# Patient Record
Sex: FEMALE | URBAN - METROPOLITAN AREA
[De-identification: names, ages, dates, MRNs, and addresses within clinical notes are randomized per-mention and may not be internally consistent; named-entity substitution may affect disease eponyms.]

---

## 2021-05-25 ENCOUNTER — RECORDS - HEALTHEAST (OUTPATIENT)
Dept: ADMINISTRATIVE | Facility: CLINIC | Age: 76
End: 2021-05-25

## 2024-06-01 ENCOUNTER — TRANSFERRED RECORDS (OUTPATIENT)
Dept: MULTI SPECIALTY CLINIC | Facility: CLINIC | Age: 79
End: 2024-06-01

## 2024-06-01 LAB — RETINOPATHY: NORMAL

## 2024-12-30 ENCOUNTER — OFFICE VISIT (OUTPATIENT)
Dept: URGENT CARE | Facility: URGENT CARE | Age: 79
End: 2024-12-30
Payer: COMMERCIAL

## 2024-12-30 ENCOUNTER — TELEPHONE (OUTPATIENT)
Dept: FAMILY MEDICINE | Facility: CLINIC | Age: 79
End: 2024-12-30
Payer: COMMERCIAL

## 2024-12-30 VITALS
TEMPERATURE: 97.5 F | SYSTOLIC BLOOD PRESSURE: 165 MMHG | RESPIRATION RATE: 16 BRPM | HEART RATE: 71 BPM | OXYGEN SATURATION: 99 % | DIASTOLIC BLOOD PRESSURE: 76 MMHG | WEIGHT: 139 LBS

## 2024-12-30 DIAGNOSIS — R07.0 THROAT PAIN: Primary | ICD-10-CM

## 2024-12-30 LAB
DEPRECATED S PYO AG THROAT QL EIA: NEGATIVE
S PYO DNA THROAT QL NAA+PROBE: NOT DETECTED

## 2024-12-30 PROCEDURE — 99203 OFFICE O/P NEW LOW 30 MIN: CPT | Performed by: NURSE PRACTITIONER

## 2024-12-30 PROCEDURE — 87651 STREP A DNA AMP PROBE: CPT | Performed by: NURSE PRACTITIONER

## 2024-12-30 RX ORDER — LORAZEPAM 0.5 MG/1
TABLET ORAL
COMMUNITY
Start: 2024-09-18

## 2024-12-30 RX ORDER — TRAZODONE HYDROCHLORIDE 50 MG/1
50 TABLET, FILM COATED ORAL
COMMUNITY
Start: 2023-09-05

## 2024-12-30 RX ORDER — GABAPENTIN 300 MG/1
300 CAPSULE ORAL
COMMUNITY
Start: 2023-11-01

## 2024-12-30 RX ORDER — AMLODIPINE BESYLATE 5 MG/1
1 TABLET ORAL DAILY
COMMUNITY
Start: 2024-10-09

## 2024-12-30 RX ORDER — LOSARTAN POTASSIUM 100 MG/1
1 TABLET ORAL
COMMUNITY
Start: 2024-10-18

## 2024-12-30 RX ORDER — FLUTICASONE PROPIONATE 50 MCG
2 SPRAY, SUSPENSION (ML) NASAL
COMMUNITY
Start: 2023-10-18

## 2024-12-30 RX ORDER — CYANOCOBALAMIN 1000 UG/ML
INJECTION, SOLUTION INTRAMUSCULAR; SUBCUTANEOUS
COMMUNITY
Start: 2024-05-14

## 2024-12-30 NOTE — TELEPHONE ENCOUNTER
Patient calling requesting an appointment for possible strep throat.    Advised patient no appointments today and to seek care at Urgent Care. Patient voiced understanding.    Rajan FLEMING RN  [facility]

## 2024-12-30 NOTE — PROGRESS NOTES
SUBJECTIVE:   Janice Anglin is a 79 year old female presenting with a chief complaint of   Chief Complaint   Patient presents with    Throat Pain     X 3 days   No headache or sinus pressure; does have post nasal drip.  Dayquil and nyquil tried which helped.   Around grandson who also has sore throat.    Past Medical History:   Diagnosis Date    Benign neoplasm of colon     Esophageal reflux     Gastroesophageal reflux    Pure hypercholesterolemia     Type II or unspecified type diabetes mellitus without mention of complication, not stated as uncontrolled     Diabetes mellitus    Unspecified asthma(493.90)     Asthma    Unspecified essential hypertension     Essential hypertension     Family History   Problem Relation Age of Onset    Diabetes Mother     Diabetes Other         self, pre diabetes    Hypertension No family hx of     Cerebrovascular Disease Mother     Cerebrovascular Disease Paternal Grandmother     Breast Cancer No family hx of     Cancer - colorectal Paternal Grandfather     Cancer - colorectal Father         ?    Alzheimer Disease No family hx of     Cancer Father         oral    Cancer Maternal Grandmother         bone    Heart Disease Mother         tia's    Thyroid Disease No family hx of     Eye Disorder Mother         BLIND DUE TO DIABETES     Current Outpatient Medications   Medication Sig Dispense Refill    amLODIPine (NORVASC) 5 MG tablet Take 1 tablet by mouth daily.      cyanocobalamin (CYANOCOBALAMIN) 1000 mcg/mL injection INJECT 1 ML (CC) INTRAMUSCULARLY ONCE EVERY MONTH      Elemental iron 65 mg Vitamin C 125 mg (VITRON C)  MG TABS tablet Take 65 mg of iron by mouth.      fluticasone (FLONASE) 50 MCG/ACT nasal spray Spray 2 sprays in nostril.      gabapentin (NEURONTIN) 300 MG capsule Take 300 mg by mouth.      LIPITOR 20 MG OR TABS 1 TABLET DAILY      LORazepam (ATIVAN) 0.5 MG tablet TAKE 1 TABLET BY MOUTH ONCE DAILY AT BEDTIME AS NEEDED FOR ANXIETY      losartan (COZAAR)  100 MG tablet Take 1 tablet by mouth daily at 2 pm.      traZODone (DESYREL) 50 MG tablet Take 50 mg by mouth.      COZAAR 25 MG OR TABS 1 TABLET DAILY (Patient not taking: Reported on 12/30/2024)      NEW MED ANTACID TABLET- 1 TABLET 2 TIMES DAILY      PROZAC 20 MG OR CAPS 1 CAPSULE EVERY MORNING      TYLENOL ARTHRITIS PAIN 650 MG OR TBCR 2 TABLETS EVERY 12 HOURS AS NEEDED       Social History     Tobacco Use    Smoking status: Former    Smokeless tobacco: Not on file    Tobacco comments:     quit 35 years ago   Substance Use Topics    Alcohol use: Yes     Comment: rare       OBJECTIVE  BP (!) 165/76   Pulse 71   Temp 97.5  F (36.4  C) (Tympanic)   Resp 16   Wt 63 kg (139 lb)   SpO2 99%     Physical Exam  Constitutional:       Appearance: Normal appearance.   HENT:      Head: Normocephalic.      Nose: Nose normal.      Mouth/Throat:      Mouth: Mucous membranes are moist.      Pharynx: Oropharyngeal exudate and posterior oropharyngeal erythema present.   Eyes:      Conjunctiva/sclera: Conjunctivae normal.   Cardiovascular:      Rate and Rhythm: Normal rate and regular rhythm.      Heart sounds: Normal heart sounds.   Pulmonary:      Effort: Pulmonary effort is normal.      Breath sounds: Normal breath sounds.   Musculoskeletal:      Cervical back: Neck supple.   Skin:     General: Skin is warm and dry.   Neurological:      Mental Status: She is alert.   Psychiatric:         Mood and Affect: Mood normal.         Labs:  Results for orders placed or performed in visit on 12/30/24 (from the past 24 hours)   Streptococcus A Rapid Screen w/Reflex to PCR - Clinic Collect    Specimen: Throat; Swab   Result Value Ref Range    Group A Strep antigen Negative Negative     Strep PCR: pending      ASSESSMENT:  1. Throat pain (Primary)    - Streptococcus A Rapid Screen w/Reflex to PCR - Clinic Collect  - Group A Streptococcus PCR Throat Swab      PLAN:  1) Increase fluids and rest  2) Try Mucinex and Neti pot over the  counter for congestion  3) Continue taking Tylenol/Ibuprofen for fever/pain relief as needed.  4) Salt water gargles and lozenges can be helpful for throat relief  5) You will only be notified of the confirmatory strep results if they are positive.

## 2024-12-30 NOTE — LETTER
December 31, 2024      Janice Savita Anglin  [address] [address]        Dear ,    We are writing to inform you of your test results.    Your further strep test was negative.    Resulted Orders   Streptococcus A Rapid Screen w/Reflex to PCR - Clinic Collect   Result Value Ref Range    Group A Strep antigen Negative Negative   Group A Streptococcus PCR Throat Swab   Result Value Ref Range    Group A strep by PCR Not Detected Not Detected    Narrative    The Xpert Xpress Strep A test, performed on the [employer]  Instrument Systems, is a rapid, qualitative in vitro diagnostic test for the detection of Streptococcus pyogenes (Group A ß-hemolytic Streptococcus, Strep A) in throat swab specimens from patients with signs and symptoms of pharyngitis. The Xpert Xpress Strep A test can be used as an aid in the diagnosis of Group A Streptococcal pharyngitis. The assay is not intended to monitor treatment for Group A Streptococcus infections. The Xpert Xpress Strep A test utilizes an automated real-time polymerase chain reaction (PCR) to detect Streptococcus pyogenes DNA.       If you have any questions or concerns, please call the clinic at the number listed above.       Sincerely,      Rula Lopez NP    Electronically signed

## 2025-01-15 PROBLEM — M25.551 PAIN IN RIGHT HIP: Status: ACTIVE | Noted: 2018-06-28

## 2025-01-15 PROBLEM — H25.019 CORTICAL SENILE CATARACT: Status: ACTIVE | Noted: 2023-07-31

## 2025-01-15 PROBLEM — H49.01: Status: ACTIVE | Noted: 2023-11-06

## 2025-01-15 PROBLEM — R53.83 FATIGUE: Status: ACTIVE | Noted: 2023-01-24

## 2025-01-15 PROBLEM — M25.50 PAIN IN JOINT: Status: ACTIVE | Noted: 2018-11-21

## 2025-01-15 PROBLEM — M81.0 OSTEOPOROSIS: Status: ACTIVE | Noted: 2018-03-29

## 2025-01-15 PROBLEM — I10 PRIMARY HYPERTENSION: Status: ACTIVE | Noted: 2018-06-19

## 2025-01-15 PROBLEM — R00.1 BRADYCARDIA: Status: ACTIVE | Noted: 2018-11-21

## 2025-01-15 PROBLEM — H25.13 AGE-RELATED NUCLEAR CATARACT, BILATERAL: Status: ACTIVE | Noted: 2023-07-31

## 2025-01-16 PROBLEM — Z78.0 POST-MENOPAUSE: Status: ACTIVE | Noted: 2025-01-16

## 2025-01-16 PROBLEM — R21 RASH: Status: ACTIVE | Noted: 2025-01-16

## 2025-01-16 PROBLEM — F41.9 ANXIETY: Status: ACTIVE | Noted: 2025-01-16

## 2025-01-17 PROBLEM — M25.50 PAIN IN JOINT: Status: RESOLVED | Noted: 2018-11-21 | Resolved: 2025-01-17

## 2025-01-17 PROBLEM — R53.83 FATIGUE: Status: RESOLVED | Noted: 2023-01-24 | Resolved: 2025-01-17

## 2025-02-11 ENCOUNTER — TELEPHONE (OUTPATIENT)
Dept: OPHTHALMOLOGY | Facility: CLINIC | Age: 80
End: 2025-02-11

## 2025-02-11 ENCOUNTER — OFFICE VISIT (OUTPATIENT)
Dept: OPHTHALMOLOGY | Facility: CLINIC | Age: 80
End: 2025-02-11
Payer: COMMERCIAL

## 2025-02-11 DIAGNOSIS — H02.88A MEIBOMIAN GLAND DYSFUNCTION (MGD) OF UPPER AND LOWER LIDS OF BOTH EYES: Primary | ICD-10-CM

## 2025-02-11 DIAGNOSIS — H25.813 COMBINED FORMS OF AGE-RELATED CATARACT OF BOTH EYES: ICD-10-CM

## 2025-02-11 DIAGNOSIS — H49.01 THIRD (OCULOMOTOR) NERVE PALSY, RIGHT EYE: ICD-10-CM

## 2025-02-11 DIAGNOSIS — H52.03 HYPEROPIA OF BOTH EYES WITH ASTIGMATISM AND PRESBYOPIA: ICD-10-CM

## 2025-02-11 DIAGNOSIS — H52.4 HYPEROPIA OF BOTH EYES WITH ASTIGMATISM AND PRESBYOPIA: ICD-10-CM

## 2025-02-11 DIAGNOSIS — H52.203 HYPEROPIA OF BOTH EYES WITH ASTIGMATISM AND PRESBYOPIA: ICD-10-CM

## 2025-02-11 DIAGNOSIS — H02.88B MEIBOMIAN GLAND DYSFUNCTION (MGD) OF UPPER AND LOWER LIDS OF BOTH EYES: Primary | ICD-10-CM

## 2025-02-11 PROCEDURE — 92015 DETERMINE REFRACTIVE STATE: CPT | Performed by: OPHTHALMOLOGY

## 2025-02-11 PROCEDURE — 92004 COMPRE OPH EXAM NEW PT 1/>: CPT | Performed by: OPHTHALMOLOGY

## 2025-02-11 ASSESSMENT — VISUAL ACUITY
OD_CC+: +3
OS_BAT_MED: 20/30+2
CORRECTION_TYPE: GLASSES
OD_BAT_HIGH: 20/50
OS_CC: 20/30
OS_CC+: +2
OD_BAT_MED: 20/40
METHOD_MR: DIAGNOSTIC MR
METHOD: SNELLEN - LINEAR
OD_PH_CC: 20/30
OD_BAT_LOW: 20/40
OD_CC: 20/50
OS_BAT_LOW: 20/25
OS_BAT_HIGH: 20/30-1

## 2025-02-11 ASSESSMENT — REFRACTION_WEARINGRX
OS_AXIS: 007
OS_ADD: +2.50
OS_SPHERE: +1.25
OD_AXIS: 002
OS_CYLINDER: +0.75
OD_SPHERE: +1.25
OD_ADD: +2.50
OD_CYLINDER: +0.50

## 2025-02-11 ASSESSMENT — CONF VISUAL FIELD
OD_SUPERIOR_NASAL_RESTRICTION: 0
OS_SUPERIOR_TEMPORAL_RESTRICTION: 0
OD_SUPERIOR_TEMPORAL_RESTRICTION: 0
OS_NORMAL: 1
OS_INFERIOR_TEMPORAL_RESTRICTION: 0
OD_INFERIOR_TEMPORAL_RESTRICTION: 0
OD_INFERIOR_NASAL_RESTRICTION: 0
METHOD: COUNTING FINGERS
OD_NORMAL: 1
OS_INFERIOR_NASAL_RESTRICTION: 0
OS_SUPERIOR_NASAL_RESTRICTION: 0

## 2025-02-11 ASSESSMENT — TONOMETRY
OS_IOP_MMHG: 18
OD_IOP_MMHG: 18
IOP_METHOD: TONOPEN

## 2025-02-11 ASSESSMENT — REFRACTION_MANIFEST
OS_CYLINDER: +0.75
OD_SPHERE: +0.75
OD_AXIS: 180
OS_SPHERE: +1.00
OS_AXIS: 013
OD_CYLINDER: +0.75

## 2025-02-11 ASSESSMENT — CUP TO DISC RATIO
OS_RATIO: 0.3
OD_RATIO: 0.3

## 2025-02-11 ASSESSMENT — SLIT LAMP EXAM - LIDS
COMMENTS: 2+ MEIBOMIAN GLAND DYSFUNCTION
COMMENTS: 2+ MEIBOMIAN GLAND DYSFUNCTION

## 2025-02-11 NOTE — PROGRESS NOTES
HPI       Cataract Evaluation    In both eyes.             Comments    Patient is here today for a cataract evaluation, referred by Dr. Jasiel Colorado. Patient feels her vision is not very good but with glasses her near vision is good. She doesn't like driving at night due to bright lights and headlights that are bothersome to her. Too much glare. Patient does notice a floater once in a while, not sure which eye. She has no flashing lights.   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     Ocular Medications: Systane both eyes as needed     Patient has had a Blepharoplasty, both eyes in 2021.   H/o Hordeolum of the LLL in 2023, Hyperopia, and cataracts both eyes           Last edited by Laureen Wray on 2/11/2025 12:44 PM.         Review of systems for the eyes was negative other than the pertinent positives/negatives listed in the HPI.      Assessment & Plan    HPI:  Janice Anglin is a 79 year old female with history of HTN, HLD, MDD presents for exam. She was previously told she had cataract but is not having any limitations in vision.      Has adopted her great grandson (born 2014) and lives alone with him    POHx: CN3 palsy, hyperopia with astigmatism and presbyopia   PMHx:HTN, HLD, MDD  Current Medications:   Current Outpatient Medications   Medication Sig Dispense Refill    amLODIPine (NORVASC) 5 MG tablet Take 1 tablet (5 mg) by mouth daily. 30 tablet 5    atorvastatin (LIPITOR) 20 MG tablet Take 1 tablet (20 mg) by mouth daily. 30 tablet 3    cyanocobalamin (CYANOCOBALAMIN) 1000 mcg/mL injection INJECT 1 ML (CC) INTRAMUSCULARLY ONCE EVERY MONTH      cyanocobalamin (VITAMIN B-12) 100 MCG tablet Take 1 tablet (100 mcg) by mouth daily. 30 tablet 3    Elemental iron 65 mg Vitamin C 125 mg (VITRON C)  MG TABS tablet Take 65 mg of iron by mouth.      FLUoxetine (PROZAC) 20 MG capsule Take 1 capsule (20 mg) by mouth every  "morning. 30 capsule 5    fluticasone (FLONASE) 50 MCG/ACT nasal spray Spray 2 sprays in nostril.      gabapentin (NEURONTIN) 300 MG capsule Take 300 mg by mouth.      ketoconazole (NIZORAL) 2 % external cream Apply topically daily. 60 g 1    LORazepam (ATIVAN) 0.5 MG tablet Take 1 tablet (0.5 mg) by mouth nightly as needed for anxiety. 30 tablet 3    losartan (COZAAR) 100 MG tablet Take 1 tablet by mouth daily at 2 pm.      losartan (COZAAR) 25 MG tablet Take 1 tablet (25 mg) by mouth daily. 30 tablet 5    NEW MED ANTACID TABLET- 1 TABLET 2 TIMES DAILY      traZODone (DESYREL) 50 MG tablet Take 1 tablet (50 mg) by mouth at bedtime. 30 tablet 1    TYLENOL ARTHRITIS PAIN 650 MG OR TBCR 2 TABLETS EVERY 12 HOURS AS NEEDED       No current facility-administered medications for this visit.     FHx:   PSHx: denies history of ocular surgeries       Current Eye Medications:      Assessment & Plan:  (H02.88A,  H02.88B) Meibomian gland dysfunction (MGD) of upper and lower lids of both eyes  (primary encounter diagnosis)  Start artificial tears four times daily (best used before reading, using a computer or watching TV)  Start warm compresses for five minute twice daily (warm wash cloth or a microwaved, uncooked bag of rice in a clean sock)     (H25.813) Combined forms of age-related cataract of both eyes  Mild cataracts are present and may account for some of the patient's visual complaints. No treatment currently recommended. The patient will monitor for vision changes and contact us with any decrease in vision. Recheck next visit   Vision does not presently inferior with ADLs  Observe    (H49.01) Third (oculomotor) nerve palsy, right eye  resolved   Per 10/31/23 note from Dr. Colorado  \"scheduled for cataract surgery but on 10/21/2023 developed a bad headache and presented to the emergency department. At that time there was no double vision or ptosis. The patient had a CT and CTA which was negative. The patient returned to " "the ED on 10/26/2023 at that time complained of droopiness of the right upper eyelid in the emergency room doctor noticed that the right eye movements were abnormal. The emergency room doctor spoke with Ophthalmology in [place] who thought this was microvascular \"    (H52.03,  H52.203,  H52.4) Hyperopia of both eyes with astigmatism and presbyopia  Patient has minimal change in hyperopia but a copy of today's glasses prescription was given.  The patient may wish to update the glasses if the lenses are scratched or the frames are too small.  Presbyopia is difficulty seeing up close and is treated with bifocals or over the counter reading glasses    Return in about 1 year (around 2/11/2026) for Annual Visit-v/t/d/MRx.        Sylvain Bunn MD     Attending Physician Attestation:  Complete documentation of historical and exam elements from today's encounter can be found in the full encounter summary report (not reduplicated in this progress note).  I personally obtained the chief complaint(s) and history of present illness.  I confirmed and edited as necessary the review of systems, past medical/surgical history, family history, social history, and examination findings as documented by others; and I examined the patient myself.  I personally reviewed the relevant tests, images, and reports as documented above.  I formulated and edited as necessary the assessment and plan and discussed the findings and management plan with the patient and family. - Sylvain Bunn MD         "

## 2025-02-11 NOTE — NURSING NOTE
Chief Complaints and History of Present Illnesses   Patient presents with    Cataract Evaluation       Chief Complaint(s) and History of Present Illness(es)       Cataract Evaluation              Laterality: both eyes              Comments    Patient is here today for a cataract evaluation, referred by Dr. Jasiel Colorado. Patient feels her vision is not very good but with glasses her near vision is good. She doesn't like driving at night due to bright lights and headlights that are bothersome to her. Too much glare. Patient does notice a floater once in a while, not sure which eye. She has no flashing lights.   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     Ocular Medications: Systane both eyes as needed     Patient has had a Blepharoplasty, both eyes in 2021.   H/o Hordeolum of the LLL in 2023, Hyperopia, and cataracts both eyes                    Laureen Wray, COA

## 2025-02-11 NOTE — TELEPHONE ENCOUNTER
Left Voicemail (1st Attempt) for the patient to call back and schedule the following:    Appointment type: return  Provider: dr. perales  Return date: 2/11/2026  Specialty phone number: [phone]   Additonal Notes:  Return in about 1 year (around 2/11/2026) for Annual Visit-v/t/willian/Nicolle lawson Complex   Orthopedics, Podiatry, Sports Medicine, Ent ,Eye , Audiology, Adult Endocrine & Diabetes, Nutrition & Medication Therapy Management Specialties   [facility]

## 2025-02-27 ENCOUNTER — OFFICE VISIT (OUTPATIENT)
Dept: DERMATOLOGY | Facility: CLINIC | Age: 80
End: 2025-02-27
Payer: COMMERCIAL

## 2025-02-27 DIAGNOSIS — D48.5 NEOPLASM OF UNCERTAIN BEHAVIOR OF SKIN: ICD-10-CM

## 2025-02-27 DIAGNOSIS — D18.01 CHERRY ANGIOMA: ICD-10-CM

## 2025-02-27 DIAGNOSIS — L82.1 SEBORRHEIC KERATOSIS: ICD-10-CM

## 2025-02-27 DIAGNOSIS — L21.9 DERMATITIS, SEBORRHEIC: Primary | ICD-10-CM

## 2025-02-27 DIAGNOSIS — D22.9 MULTIPLE BENIGN NEVI: ICD-10-CM

## 2025-02-27 DIAGNOSIS — L66.12 FRONTAL FIBROSING ALOPECIA: ICD-10-CM

## 2025-02-27 DIAGNOSIS — L81.4 LENTIGO: ICD-10-CM

## 2025-02-27 RX ORDER — KETOCONAZOLE 20 MG/ML
SHAMPOO, SUSPENSION TOPICAL
Qty: 120 ML | Refills: 5 | Status: SHIPPED | OUTPATIENT
Start: 2025-02-27

## 2025-02-27 RX ORDER — CLOBETASOL PROPIONATE 0.5 MG/ML
SOLUTION TOPICAL
Qty: 50 ML | Refills: 5 | Status: SHIPPED | OUTPATIENT
Start: 2025-02-27

## 2025-02-27 ASSESSMENT — PAIN SCALES - GENERAL: PAINLEVEL_OUTOF10: NO PAIN (0)

## 2025-02-27 NOTE — PROGRESS NOTES
Janice Anglin is a pleasant 79 year old year old female patient here today for skin check.  She denies any new or changing nevi. No painful or bleeding skin lesions she notes scalp is itchy and also receding hair line. Patient has no other skin complaints today.  Remainder of the HPI, Meds, PMH, Allergies, FH, and SH was reviewed in chart.    Pertinent Hx:  no personal history of skin cancer.   Past Medical History:   Diagnosis Date    Benign neoplasm of colon     Diabetes mellitus, type 2 (H)     Improved after gastric bypass surgery      Esophageal reflux     Gastroesophageal reflux    Fatigue 01/24/2023    History of gastrointestinal disease 12/14/2012    Menopausal hot flushes 06/28/2011    IMO Update 10/11      Pain in joint 11/21/2018    Pure hypercholesterolemia     Type II or unspecified type diabetes mellitus without mention of complication, not stated as uncontrolled     Diabetes mellitus    Unspecified asthma(493.90)     Asthma    Unspecified essential hypertension     Essential hypertension       Past Surgical History:   Procedure Laterality Date    C OB/GYN PROCEDURE                          DATE:  1973    fibrois    RW OB/GYN (ABSTRACTED)  2000    gallbladder     ZZHC COLONOSCOPY W SNARE REMOVAL TUMOR/POLYP/LESION          Family History   Problem Relation Age of Onset    Diabetes Mother     Diabetes Other         self, pre diabetes    Hypertension No family hx of     Cerebrovascular Disease Mother     Cerebrovascular Disease Paternal Grandmother     Breast Cancer No family hx of     Cancer - colorectal Paternal Grandfather     Cancer - colorectal Father         ?    Alzheimer Disease No family hx of     Cancer Father         oral    Cancer Maternal Grandmother         bone    Heart Disease Mother         tia's    Thyroid Disease No family hx of     Eye Disorder Mother         BLIND DUE TO DIABETES       Social History     Socioeconomic History    Marital status:      Spouse name: Not on  file    Number of children: Not on file    Years of education: Not on file    Highest education level: Not on file   Occupational History     Comment: housewife   Tobacco Use    Smoking status: Former     Passive exposure: Never    Smokeless tobacco: Never    Tobacco comments:     quit 35 years ago   Vaping Use    Vaping status: Never Used   Substance and Sexual Activity    Alcohol use: Yes     Comment: rare    Drug use: Not on file    Sexual activity: Yes     Partners: Male   Other Topics Concern     Service Not Asked    Blood Transfusions No    Caffeine Concern No    Occupational Exposure Not Asked    Hobby Hazards Not Asked    Sleep Concern Yes    Stress Concern Yes    Weight Concern Yes    Special Diet Yes    Back Care Yes    Exercise Yes    Bike Helmet Not Asked    Seat Belt Yes    Self-Exams Yes   Social History Narrative    Not on file     Social Drivers of Health     Financial Resource Strain: Low Risk  (1/16/2025)    Financial Resource Strain     Within the past 12 months, have you or your family members you live with been unable to get utilities (heat, electricity) when it was really needed?: No   Food Insecurity: Low Risk  (1/16/2025)    Food Insecurity     Within the past 12 months, did you worry that your food would run out before you got money to buy more?: No     Within the past 12 months, did the food you bought just not last and you didn t have money to get more?: No   Transportation Needs: Low Risk  (1/16/2025)    Transportation Needs     Within the past 12 months, has lack of transportation kept you from medical appointments, getting your medicines, non-medical meetings or appointments, work, or from getting things that you need?: No   Physical Activity: Unknown (1/16/2025)    Exercise Vital Sign     Days of Exercise per Week: Patient declined     Minutes of Exercise per Session: Not on file   Stress: Stress Concern Present (1/16/2025)    [employer] -  Occupational Stress Questionnaire     Feeling of Stress : To some extent   Social Connections: Unknown (1/16/2025)    Social Connection and Isolation Panel [NHANES]     Frequency of Communication with Friends and Family: Not on file     Frequency of Social Gatherings with Friends and Family: Twice a week     Attends Yarsani Services: Not on file     Active Member of Clubs or Organizations: Not on file     Attends Club or Organization Meetings: Not on file     Marital Status: Not on file   Interpersonal Safety: Low Risk  (1/16/2025)    Interpersonal Safety     Do you feel physically and emotionally safe where you currently live?: Yes     Within the past 12 months, have you been hit, slapped, kicked or otherwise physically hurt by someone?: No     Within the past 12 months, have you been humiliated or emotionally abused in other ways by your partner or ex-partner?: No   Housing Stability: Low Risk  (1/16/2025)    Housing Stability     Do you have housing? : Yes     Are you worried about losing your housing?: No       Outpatient Encounter Medications as of 2/27/2025   Medication Sig Dispense Refill    clobetasol (TEMOVATE) 0.05 % external solution Apply three times weekly to scalp. 50 mL 5    ketoconazole (NIZORAL) 2 % external shampoo Leave on scalp for 5 minutes then rinse. Use 1-2 times weekly. 120 mL 5    amLODIPine (NORVASC) 5 MG tablet Take 1 tablet (5 mg) by mouth daily. 30 tablet 5    atorvastatin (LIPITOR) 20 MG tablet Take 1 tablet (20 mg) by mouth daily. 30 tablet 3    cyanocobalamin (CYANOCOBALAMIN) 1000 mcg/mL injection INJECT 1 ML (CC) INTRAMUSCULARLY ONCE EVERY MONTH      cyanocobalamin (VITAMIN B-12) 100 MCG tablet Take 1 tablet (100 mcg) by mouth daily. 30 tablet 3    Elemental iron 65 mg Vitamin C 125 mg (VITRON C)  MG TABS tablet Take 65 mg of iron by mouth.      FLUoxetine (PROZAC) 20 MG capsule Take 1 capsule (20 mg) by mouth every morning. 30 capsule 5    fluticasone (FLONASE) 50 MCG/ACT  nasal spray Spray 2 sprays in nostril.      gabapentin (NEURONTIN) 300 MG capsule Take 300 mg by mouth.      ketoconazole (NIZORAL) 2 % external cream Apply topically daily. 60 g 1    LORazepam (ATIVAN) 0.5 MG tablet Take 1 tablet (0.5 mg) by mouth nightly as needed for anxiety. 30 tablet 3    losartan (COZAAR) 100 MG tablet Take 1 tablet by mouth daily at 2 pm.      losartan (COZAAR) 25 MG tablet Take 1 tablet (25 mg) by mouth daily. 30 tablet 5    NEW MED ANTACID TABLET- 1 TABLET 2 TIMES DAILY      traZODone (DESYREL) 50 MG tablet Take 1 tablet (50 mg) by mouth at bedtime. 30 tablet 1    TYLENOL ARTHRITIS PAIN 650 MG OR TBCR 2 TABLETS EVERY 12 HOURS AS NEEDED       No facility-administered encounter medications on file as of 2/27/2025.             O:   NAD, WDWN, Alert & Oriented, Mood & Affect wnl, Vitals stable   Here today with her daughter    LMP 01/01/1972    General appearance normal   Vitals stable   Alert, oriented and in no acute distress      Hair receding on frontal and temporal scalp, scarring hair loss   Mild scaling on scalp  Stuck on papules and brown macules on trunk and ext   Red papules on trunk  1.1 cm brown irregular macule left low back  0.6 cm brown irregular macule on left upper arm   Brown papules and macules with regular pigment network on torso and extremities     The remainder of skin exam is normal       Eyes: Conjunctivae/lids:Normal     ENT: Lips: normal    MSK:Normal    Cardiovascular: peripheral edema none    Pulm: Breathing Normal    Neuro/Psych: Orientation:Alert and Orientedx3 ; Mood/Affect:normal   A/P:  R/O atypical nevus on left low back and left upper arm  TANGENTIAL BIOPSY SENT OUT:  After consent, anesthesia with LEC and prep, tangential excision performed and specimen sent out for permanent section histology.  No complications and routine wound care. Patient told to call our office in 1-2 weeks for result.      2. FFA  Discussed scarring hair loss, treatment is preserve  current hair  Apply clobetasol solution three times weekly to hair line.   3. Seborrheic dermatitis   Use ketoconazole shampoo leave on scalp for 5 minutes then rinse. Use 1-2 times weekly.   Apply clobetasol solution twice daily.   4.  Seborrheic keratosis, lentigo, angioma, benign nevi   It was a pleasure speaking to Janice Anglin today.  BENIGN LESIONS DISCUSSED WITH PATIENT:  I discussed the specifics of tumor, prognosis, and genetics of benign lesions.  I explained that treatment of these lesions would be purely cosmetic and not medically neccessary.  I discussed with patient different removal options including excision, cautery and /or laser.      Nature and genetics of benign skin lesions dicussed with patient.  Signs and Symptoms of skin cancer discussed with patient.  ABCDEs of melanoma reviewed with patient.  Patient encouraged to perform monthly skin exams.  UV precautions reviewed with patient.  Risks of non-melanoma skin cancer discussed with patient   Return to clinic pending biopsy results

## 2025-02-27 NOTE — PATIENT INSTRUCTIONS
Wound Care Instructions   FOR SUPERFICIAL WOUNDS   [facility] [place] [phone]    Left Upper Arm & Left Low Back     AFTER 24 HOURS YOU SHOULD REMOVE THE BANDAGE AND BEGIN DAILY DRESSING CHANGES AS FOLLOWS:     1) Remove Dressing.     2) Clean and dry the area with tap water using a Q-tip or sterile gauze pad.     3) Apply Vaseline, Aquaphor, Polysporin ointment or Bacitracin ointment over entire wound.  Do NOT use Neosporin ointment.     4) Cover the wound with a band-aid, or a sterile non-stick gauze pad and micropore paper tape      REPEAT THESE INSTRUCTIONS AT LEAST ONCE A DAY UNTIL THE WOUND HAS COMPLETELY HEALED.    It is an old wives tale that a wound heals better when it is exposed to air and allowed to dry out. The wound will heal faster with a better cosmetic result if it is kept moist with ointment and covered with a bandage.    **Do not let the wound dry out.**      Supplies Needed:      *Cotton tipped applicators (Q-tips)    *Polysporin Ointment or Bacitracin Ointment (NOT NEOSPORIN)    *Band-aids or non-stick gauze pads and micropore paper tape.      PATIENT INFORMATION:    During the healing process you will notice a number of changes. All wounds develop a small halo of redness surrounding the wound.  This means healing is occurring. Severe itching with extensive redness usually indicates sensitivity to the ointment or bandage tape used to dress the wound.  You should call our office if this develops.      Swelling  and/or discoloration around your surgical site is common, particularly when performed around the eye.    All wounds normally drain.  The larger the wound the more drainage there will be.  After 7-10 days, you will notice the wound beginning to shrink and new skin will begin to grow.  The wound is healed when you can see skin has formed over the entire area.  A healed wound has a healthy, shiny look to the surface and is red to dark pink in color to  normalize.  Wounds may take approximately 4-6 weeks to heal.  Larger wounds may take 6-8 weeks.  After the wound is healed you may discontinue dressing changes.    You may experience a sensation of tightness as your wound heals. This is normal and will gradually subside.    Your healed wound may be sensitive to temperature changes. This sensitivity improves with time, but if you re having a lot of discomfort, try to avoid temperature extremes.    Patients frequently experience itching after their wound appears to have healed because of the continue healing under the skin.  Plain Vaseline will help relieve the itching.    POSSIBLE COMPLICATIONS    BLEEDING:    Leave the bandage in place.  Use tightly rolled up gauze or a cloth to apply direct pressure over the bandage for 30  minutes.  Reapply pressure for an additional 30 minutes if necessary  Use additional gauze and tape to maintain pressure once the bleeding has stopped.     Proper skin care from [facility]:    -Eliminate harsh soaps as they strip the natural oils from the skin, often resulting in dry itchy skin ( i.e. Dial, Zest, Marshall[place] Spring)  -Use mild soaps such as Cetaphil or Dove Sensitive Skin in the shower. You do not need to use soap on arms, legs, and trunk every time you shower unless visibly soiled.   -Avoid hot or cold showers.  -After showering, lightly dry off and apply moisturizing within 2-3 minutes. This will help trap moisture in the skin.   -Aggressive use of a moisturizer at least 1-2 times a day to the entire body (including -Vanicream, Cetaphil, Aquaphor or Cerave) and moisturize hands after every washing.  -We recommend using moisturizers that come in a tub that needs to be scooped out, not a pump. This has more of an oil base. It will hold moisture in your skin much better than a water base moisturizer. The above recommended are non-pore clogging.      Wear a sunscreen with at least SPF 30 on your face, ears, neck and V of the  chest daily. Wear sunscreen on other areas of the body if those areas are exposed to the sun throughout the day. Sunscreens can contain physical and/or chemical blockers. Physical blockers are less likely to clog pores, these include zinc oxide and titanium dioxide. Reapply every two hour and after swimming.     Sunscreen examples: https://www.ewg.org/sunscreen/    UV radiation  UVA radiation remains constant throughout the day and throughout the year. It is a longer wavelength than UVB and therefore penetrates deeper into the skin leading to immediate and delayed tanning, photoaging, and skin cancer. 70-80% of UVA and UVB radiation occurs between the hours of 10am-2pm.  UVB radiation  UVB radiation causes the most harmful effects and is more significant during the summer months. However, snow and ice can reflect UVB radiation leading to skin damage during the winter months as well. UVB radiation is responsible for tanning, burning, inflammation, delayed erythema (pinkness), pigmentation (brown spots), and skin cancer.     I recommend self monthly full body exams and yearly full body exams with a dermatology provider. If you develop a new or changing lesion please follow up for examination. Most skin cancers are pink and scaly or pink and pearly. However, we do see blue/brown/black skin cancers.  Consider the ABCDEs of melanoma when giving yourself your monthly full body exam ( don't forget the groin, buttocks, feet, toes, etc). A-asymmetry, B-borders, C-color, D-diameter, E-elevation or evolving. If you see any of these changes please follow up in clinic. If you cannot see your back I recommend purchasing a hand held mirror to use with a larger wall mirror.       Checking for Skin Cancer  You can find cancer early by checking your skin each month. There are 3 kinds of skin cancer. They are melanoma, basal cell carcinoma, and squamous cell carcinoma. Doing monthly skin checks is the best way to find new marks or  skin changes. Follow the instructions below for checking your skin.   The ABCDEs of checking moles for melanoma   Check your moles or growths for signs of melanoma using ABCDE:   Asymmetry: the sides of the mole or growth don t match  Border: the edges are ragged, notched, or blurred  Color: the color within the mole or growth varies  Diameter: the mole or growth is larger than 6 mm (size of a pencil eraser)  Evolving: the size, shape, or color of the mole or growth is changing (evolving is not shown in the images below)    Checking for other types of skin cancer  Basal cell carcinoma or squamous cell carcinoma have symptoms such as:     A spot or mole that looks different from all other marks on your skin  Changes in how an area feels, such as itching, tenderness, or pain  Changes in the skin's surface, such as oozing, bleeding, or scaliness  A sore that does not heal  New swelling or redness beyond the border of a mole    Who s at risk?  Anyone can get skin cancer. But you are at greater risk if you have:   Fair skin, light-colored hair, or light-colored eyes  Many moles or abnormal moles on your skin  A history of sunburns from sunlight or tanning beds  A family history of skin cancer  A history of exposure to radiation or chemicals  A weakened immune system  If you have had skin cancer in the past, you are at risk for recurring skin cancer.   How to check your skin  Do your monthly skin checkups in front of a full-length mirror. Check all parts of your body, including your:   Head (ears, face, neck, and scalp)  Torso (front, back, and sides)  Arms (tops, undersides, upper, and lower armpits)  Hands (palms, backs, and fingers, including under the nails)  Buttocks and genitals  Legs (front, back, and sides)  Feet (tops, soles, toes, including under the nails, and between toes)  If you have a lot of moles, take digital photos of them each month. Make sure to take photos both up close and from a distance. These can  help you see if any moles change over time.   Most skin changes are not cancer. But if you see any changes in your skin, call your doctor right away. Only he or she can diagnose a problem. If you have skin cancer, seeing your doctor can be the first step toward getting the treatment that could save your life.   Howie last reviewed this educational content on 4/1/2019 2000-2020 [employer]. [address] [place] [address], [place] [address]. All rights reserved. This information is not intended as a substitute for professional medical care. Always follow your healthcare professional's instructions.       When should I call my doctor?  If you are worsening or not improving, please, contact us or seek urgent care as noted below.     Who should I call with questions (adults)?    [facility] [phone]  For urgent needs outside of business hours call the [facility] at [phone] and ask for the dermatology resident on call to be paged  If this is a medical emergency and you are unable to reach an ER, Call [phone]      If you need a prescription refill, please contact your pharmacy. Refills are approved or denied by our Physicians during normal business hours, Monday through Friday.  Per office policy, refills will not be granted if you have not been seen within the past year (or sooner depending on the condition).

## 2025-02-27 NOTE — NURSING NOTE
Chief Complaint   Patient presents with    Derm Problem     Constant itchy scalp/ eye brows rash that has been going on for 2 years currently using dandruff shampoo        There were no vitals filed for this visit.  Wt Readings from Last 1 Encounters:   01/16/25 62.6 kg (138 lb)       Nirmala Diaz LPN .................2/27/2025

## 2025-02-27 NOTE — LETTER
2/27/2025      Janice Anglin  [address]  [facility] [address]      Dear Colleague,    Thank you for referring your patient, Janice Anglin, to the [facility]. Please see a copy of my visit note below.    Janice Anglin is a pleasant 79 year old year old female patient here today for skin check.  She denies any new or changing nevi. No painful or bleeding skin lesions she notes scalp is itchy and also receding hair line. Patient has no other skin complaints today.  Remainder of the HPI, Meds, PMH, Allergies, FH, and SH was reviewed in chart.    Pertinent Hx:  no personal history of skin cancer.   Past Medical History:   Diagnosis Date     Benign neoplasm of colon      Diabetes mellitus, type 2 (H)     Improved after gastric bypass surgery       Esophageal reflux     Gastroesophageal reflux     Fatigue 01/24/2023     History of gastrointestinal disease 12/14/2012     Menopausal hot flushes 06/28/2011    IMO Update 10/11       Pain in joint 11/21/2018     Pure hypercholesterolemia      Type II or unspecified type diabetes mellitus without mention of complication, not stated as uncontrolled     Diabetes mellitus     Unspecified asthma(493.90)     Asthma     Unspecified essential hypertension     Essential hypertension       Past Surgical History:   Procedure Laterality Date     C OB/GYN PROCEDURE                          DATE:  1973    fibrois     RW OB/GYN (ABSTRACTED)  2000    gallbladder      ZZHC COLONOSCOPY W SNARE REMOVAL TUMOR/POLYP/LESION          Family History   Problem Relation Age of Onset     Diabetes Mother      Diabetes Other         self, pre diabetes     Hypertension No family hx of      Cerebrovascular Disease Mother      Cerebrovascular Disease Paternal Grandmother      Breast Cancer No family hx of      Cancer - colorectal Paternal Grandfather      Cancer - colorectal Father         ?     Alzheimer Disease No family hx of      Cancer Father          oral     Cancer Maternal Grandmother         bone     Heart Disease Mother         tia's     Thyroid Disease No family hx of      Eye Disorder Mother         BLIND DUE TO DIABETES       Social History     Socioeconomic History     Marital status:      Spouse name: Not on file     Number of children: Not on file     Years of education: Not on file     Highest education level: Not on file   Occupational History     Comment: housewife   Tobacco Use     Smoking status: Former     Passive exposure: Never     Smokeless tobacco: Never     Tobacco comments:     quit 35 years ago   Vaping Use     Vaping status: Never Used   Substance and Sexual Activity     Alcohol use: Yes     Comment: rare     Drug use: Not on file     Sexual activity: Yes     Partners: Male   Other Topics Concern      Service Not Asked     Blood Transfusions No     Caffeine Concern No     Occupational Exposure Not Asked     Hobby Hazards Not Asked     Sleep Concern Yes     Stress Concern Yes     Weight Concern Yes     Special Diet Yes     Back Care Yes     Exercise Yes     Bike Helmet Not Asked     Seat Belt Yes     Self-Exams Yes   Social History Narrative     Not on file     Social Drivers of Health     Financial Resource Strain: Low Risk  (1/16/2025)    Financial Resource Strain      Within the past 12 months, have you or your family members you live with been unable to get utilities (heat, electricity) when it was really needed?: No   Food Insecurity: Low Risk  (1/16/2025)    Food Insecurity      Within the past 12 months, did you worry that your food would run out before you got money to buy more?: No      Within the past 12 months, did the food you bought just not last and you didn t have money to get more?: No   Transportation Needs: Low Risk  (1/16/2025)    Transportation Needs      Within the past 12 months, has lack of transportation kept you from medical appointments, getting your medicines, non-medical meetings or appointments,  work, or from getting things that you need?: No   Physical Activity: Unknown (1/16/2025)    Exercise Vital Sign      Days of Exercise per Week: Patient declined      Minutes of Exercise per Session: Not on file   Stress: Stress Concern Present (1/16/2025)    [employer] - Occupational Stress Questionnaire      Feeling of Stress : To some extent   Social Connections: Unknown (1/16/2025)    Social Connection and Isolation Panel [NHANES]      Frequency of Communication with Friends and Family: Not on file      Frequency of Social Gatherings with Friends and Family: Twice a week      Attends [employer] Services: Not on file      Active Member of Clubs or Organizations: Not on file      Attends Club or Organization Meetings: Not on file      Marital Status: Not on file   Interpersonal Safety: Low Risk  (1/16/2025)    Interpersonal Safety      Do you feel physically and emotionally safe where you currently live?: Yes      Within the past 12 months, have you been hit, slapped, kicked or otherwise physically hurt by someone?: No      Within the past 12 months, have you been humiliated or emotionally abused in other ways by your partner or ex-partner?: No   Housing Stability: Low Risk  (1/16/2025)    Housing Stability      Do you have housing? : Yes      Are you worried about losing your housing?: No       Outpatient Encounter Medications as of 2/27/2025   Medication Sig Dispense Refill     clobetasol (TEMOVATE) 0.05 % external solution Apply three times weekly to scalp. 50 mL 5     ketoconazole (NIZORAL) 2 % external shampoo Leave on scalp for 5 minutes then rinse. Use 1-2 times weekly. 120 mL 5     amLODIPine (NORVASC) 5 MG tablet Take 1 tablet (5 mg) by mouth daily. 30 tablet 5     atorvastatin (LIPITOR) 20 MG tablet Take 1 tablet (20 mg) by mouth daily. 30 tablet 3     cyanocobalamin (CYANOCOBALAMIN) 1000 mcg/mL injection INJECT 1 ML (CC) INTRAMUSCULARLY ONCE EVERY MONTH       cyanocobalamin  (VITAMIN B-12) 100 MCG tablet Take 1 tablet (100 mcg) by mouth daily. 30 tablet 3     Elemental iron 65 mg Vitamin C 125 mg (VITRON C)  MG TABS tablet Take 65 mg of iron by mouth.       FLUoxetine (PROZAC) 20 MG capsule Take 1 capsule (20 mg) by mouth every morning. 30 capsule 5     fluticasone (FLONASE) 50 MCG/ACT nasal spray Spray 2 sprays in nostril.       gabapentin (NEURONTIN) 300 MG capsule Take 300 mg by mouth.       ketoconazole (NIZORAL) 2 % external cream Apply topically daily. 60 g 1     LORazepam (ATIVAN) 0.5 MG tablet Take 1 tablet (0.5 mg) by mouth nightly as needed for anxiety. 30 tablet 3     losartan (COZAAR) 100 MG tablet Take 1 tablet by mouth daily at 2 pm.       losartan (COZAAR) 25 MG tablet Take 1 tablet (25 mg) by mouth daily. 30 tablet 5     NEW MED ANTACID TABLET- 1 TABLET 2 TIMES DAILY       traZODone (DESYREL) 50 MG tablet Take 1 tablet (50 mg) by mouth at bedtime. 30 tablet 1     TYLENOL ARTHRITIS PAIN 650 MG OR TBCR 2 TABLETS EVERY 12 HOURS AS NEEDED       No facility-administered encounter medications on file as of 2/27/2025.             O:   NAD, WDWN, Alert & Oriented, Mood & Affect wnl, Vitals stable   Here today with her daughter    LMP 01/01/1970    General appearance normal   Vitals stable   Alert, oriented and in no acute distress      Hair receding on frontal and temporal scalp, scarring hair loss   Mild scaling on scalp  Stuck on papules and brown macules on trunk and ext   Red papules on trunk  1.1 cm brown irregular macule left low back  0.6 cm brown irregular macule on left upper arm   Brown papules and macules with regular pigment network on torso and extremities     The remainder of skin exam is normal       Eyes: Conjunctivae/lids:Normal     ENT: Lips: normal    MSK:Normal    Cardiovascular: peripheral edema none    Pulm: Breathing Normal    Neuro/Psych: Orientation:Alert and Orientedx3 ; Mood/Affect:normal   A/P:  R/O atypical nevus on left low back and left upper  arm  TANGENTIAL BIOPSY SENT OUT:  After consent, anesthesia with LEC and prep, tangential excision performed and specimen sent out for permanent section histology.  No complications and routine wound care. Patient told to call our office in 1-2 weeks for result.      2. FFA  Discussed scarring hair loss, treatment is preserve current hair  Apply clobetasol solution three times weekly to hair line.   3. Seborrheic dermatitis   Use ketoconazole shampoo leave on scalp for 5 minutes then rinse. Use 1-2 times weekly.   Apply clobetasol solution twice daily.   4.  Seborrheic keratosis, lentigo, angioma, benign nevi   It was a pleasure speaking to Janice Anglin today.  BENIGN LESIONS DISCUSSED WITH PATIENT:  I discussed the specifics of tumor, prognosis, and genetics of benign lesions.  I explained that treatment of these lesions would be purely cosmetic and not medically neccessary.  I discussed with patient different removal options including excision, cautery and /or laser.      Nature and genetics of benign skin lesions dicussed with patient.  Signs and Symptoms of skin cancer discussed with patient.  ABCDEs of melanoma reviewed with patient.  Patient encouraged to perform monthly skin exams.  UV precautions reviewed with patient.  Risks of non-melanoma skin cancer discussed with patient   Return to clinic pending biopsy results      Again, thank you for allowing me to participate in the care of your patient.        Sincerely,        Amanda Stallworth PA-C    Electronically signed

## 2025-03-02 LAB
PATH REPORT.COMMENTS IMP SPEC: NORMAL
PATH REPORT.COMMENTS IMP SPEC: NORMAL
PATH REPORT.FINAL DX SPEC: NORMAL
PATH REPORT.GROSS SPEC: NORMAL
PATH REPORT.MICROSCOPIC SPEC OTHER STN: NORMAL
PATH REPORT.RELEVANT HX SPEC: NORMAL

## 2025-07-17 ENCOUNTER — OFFICE VISIT (OUTPATIENT)
Dept: FAMILY MEDICINE | Facility: CLINIC | Age: 80
End: 2025-07-17
Payer: COMMERCIAL

## 2025-07-17 VITALS
HEIGHT: 62 IN | TEMPERATURE: 97.4 F | OXYGEN SATURATION: 97 % | DIASTOLIC BLOOD PRESSURE: 74 MMHG | RESPIRATION RATE: 14 BRPM | BODY MASS INDEX: 25.58 KG/M2 | SYSTOLIC BLOOD PRESSURE: 130 MMHG | HEART RATE: 60 BPM | WEIGHT: 139 LBS

## 2025-07-17 DIAGNOSIS — R42 LIGHTHEADED: Primary | ICD-10-CM

## 2025-07-17 DIAGNOSIS — R06.02 SOB (SHORTNESS OF BREATH): ICD-10-CM

## 2025-07-17 DIAGNOSIS — I10 ESSENTIAL HYPERTENSION: ICD-10-CM

## 2025-07-17 LAB
ANION GAP SERPL CALCULATED.3IONS-SCNC: 12 MMOL/L (ref 7–15)
BUN SERPL-MCNC: 16.7 MG/DL (ref 8–23)
CALCIUM SERPL-MCNC: 9.1 MG/DL (ref 8.8–10.4)
CHLORIDE SERPL-SCNC: 103 MMOL/L (ref 98–107)
CREAT SERPL-MCNC: 0.82 MG/DL (ref 0.51–0.95)
EGFRCR SERPLBLD CKD-EPI 2021: 71 ML/MIN/1.73M2
ERYTHROCYTE [DISTWIDTH] IN BLOOD BY AUTOMATED COUNT: 12.6 % (ref 10–15)
GLUCOSE SERPL-MCNC: 106 MG/DL (ref 70–99)
HCO3 SERPL-SCNC: 26 MMOL/L (ref 22–29)
HCT VFR BLD AUTO: 38.9 % (ref 35–47)
HGB BLD-MCNC: 12.5 G/DL (ref 11.7–15.7)
MCH RBC QN AUTO: 29.1 PG (ref 26.5–33)
MCHC RBC AUTO-ENTMCNC: 32.1 G/DL (ref 31.5–36.5)
MCV RBC AUTO: 91 FL (ref 78–100)
PLATELET # BLD AUTO: 175 10E3/UL (ref 150–450)
POTASSIUM SERPL-SCNC: 4.9 MMOL/L (ref 3.4–5.3)
RBC # BLD AUTO: 4.29 10E6/UL (ref 3.8–5.2)
SODIUM SERPL-SCNC: 141 MMOL/L (ref 135–145)
T4 FREE SERPL-MCNC: 1.08 NG/DL (ref 0.9–1.7)
TSH SERPL DL<=0.005 MIU/L-ACNC: 7.26 UIU/ML (ref 0.3–4.2)
WBC # BLD AUTO: 6.3 10E3/UL (ref 4–11)

## 2025-07-17 ASSESSMENT — PATIENT HEALTH QUESTIONNAIRE - PHQ9
10. IF YOU CHECKED OFF ANY PROBLEMS, HOW DIFFICULT HAVE THESE PROBLEMS MADE IT FOR YOU TO DO YOUR WORK, TAKE CARE OF THINGS AT HOME, OR GET ALONG WITH OTHER PEOPLE: NOT DIFFICULT AT ALL
SUM OF ALL RESPONSES TO PHQ QUESTIONS 1-9: 7
SUM OF ALL RESPONSES TO PHQ QUESTIONS 1-9: 7

## 2025-07-17 ASSESSMENT — PAIN SCALES - GENERAL: PAINLEVEL_OUTOF10: MILD PAIN (3)

## 2025-07-17 NOTE — PROGRESS NOTES
"  Assessment & Plan     Lightheaded  Patient had an episode of lightheadedness about a month ago, experiencing exertional shortness of breath.  Physical examination overall unremarkable.  Differentials discussed in detail including but not limited to cardiovascular and metabolic etiology.  EKG showed normal sinus rhythm.  CBC, BMP, TSH and echocardiogram ordered for further evaluation.  Recommended to continue well hydration, healthy diet and current medications.  - CBC with platelets; Future  - Basic metabolic panel  (Ca, Cl, CO2, Creat, Gluc, K, Na, BUN); Future  - EKG 12-lead complete w/read - Clinics  - Echocardiogram Complete; Future    SOB (shortness of breath)  - Echocardiogram Complete; Future    Essential hypertension  - TSH with free T4 reflex; Future      Subjective   Janice is a 80 year old, presenting for the following health issues:  Shortness of Breath and Headache        7/17/2025    10:58 AM   Additional Questions   Roomed by Mami MCDERMOTT   Accompanied by Self     Onset: x 3-4 weeks  Description: sob, with activities  Intensity: moderate  Progression of Symptoms:  worsening  Accompanying Signs & Symptoms: back in middle  Previous history of similar problem: no  Precipitating factors:        Worsened by: activity  Alleviating factors:        Improved by: rest  Therapies tried and outcome:  none       Review of Systems  Constitutional, neuro, ENT, endocrine, pulmonary, cardiac, gastrointestinal, genitourinary, musculoskeletal, integument and psychiatric systems are negative, except as otherwise noted.      Objective    /74 (BP Location: Right arm, Patient Position: Sitting, Cuff Size: Adult Regular)   Pulse 60   Temp 97.4  F (36.3  C) (Tympanic)   Resp 14   Ht 1.575 m (5' 2\")   Wt 63 kg (139 lb)   LMP 01/01/1970   SpO2 97%   BMI 25.42 kg/m    Body mass index is 25.42 kg/m .  Wt Readings from Last 10 Encounters:   07/17/25 63 kg (139 lb)   01/16/25 62.6 kg (138 lb)   12/30/24 63 kg (139 " lb)      Physical Exam   GENERAL: alert and no distress  EYES: Eyes grossly normal to inspection, PERRL and conjunctivae and sclerae normal  HENT: normal cephalic/atraumatic, nose and mouth without ulcers or lesions, oropharynx clear, and oral mucous membranes moist  NECK: no adenopathy, no asymmetry, masses, or scars  RESP: lungs clear to auscultation - no rales, rhonchi or wheezes  CV: regular rates and rhythm, normal S1 S2, no S3 or S4, and no murmur, click or rub  ABDOMEN: soft, nontender  MS: no gross musculoskeletal defects noted, no edema  SKIN: no suspicious lesions or rashes  NEURO: Normal strength and tone, mentation intact and speech normal  PSYCH: mentation appears normal, affect normal/bright          Signed Electronically by: Nazario Valenzuela MD

## 2025-07-31 ENCOUNTER — OFFICE VISIT (OUTPATIENT)
Dept: FAMILY MEDICINE | Facility: CLINIC | Age: 80
End: 2025-07-31
Payer: COMMERCIAL

## 2025-07-31 VITALS
DIASTOLIC BLOOD PRESSURE: 70 MMHG | SYSTOLIC BLOOD PRESSURE: 128 MMHG | HEIGHT: 62 IN | WEIGHT: 137.5 LBS | OXYGEN SATURATION: 99 % | BODY MASS INDEX: 25.3 KG/M2 | TEMPERATURE: 97.9 F | HEART RATE: 62 BPM | RESPIRATION RATE: 16 BRPM

## 2025-07-31 DIAGNOSIS — I83.91 VARICOSE VEINS OF RIGHT LOWER EXTREMITY, UNSPECIFIED WHETHER COMPLICATED: ICD-10-CM

## 2025-07-31 DIAGNOSIS — R42 LIGHTHEADED: Primary | ICD-10-CM

## 2025-07-31 DIAGNOSIS — E03.8 SUBCLINICAL HYPOTHYROIDISM: ICD-10-CM

## 2025-07-31 ASSESSMENT — PAIN SCALES - GENERAL: PAINLEVEL_OUTOF10: NO PAIN (0)

## 2025-07-31 NOTE — PROGRESS NOTES
"  Assessment & Plan     Lightheaded  80-year-old female presented for follow-up visit, was experiencing some lightheadedness recently, feeling better now.  Blood workup and EKG findings reviewed.  Scheduled to have echocardiogram next month.  Recommended to continue hydration, healthy diet and current medications    Varicose veins of right lower extremity, unspecified whether complicated  Involving right lower extremity.  Suggested elevation when possible, compression stockings    Subclinical hypothyroidism  Recommended to have repeat TSH in October as scheduled      BMI  Estimated body mass index is 25.15 kg/m  as calculated from the following:    Height as of this encounter: 1.575 m (5' 2\").    Weight as of this encounter: 62.4 kg (137 lb 8 oz).       Maty Camacho is a 80 year old, presenting for the following health issues:  Dizziness        7/31/2025     1:30 PM   Additional Questions   Roomed by Joana JACOB CMA   Accompanied by Self     HPI      Follow up after 7/17 visit due to lightheadedness. She notes that since that visit she has not had \"as bad\" of episodes of lightheadedness. No LOC/falls. No vision changes, chest pain, headaches, sweating, swelling or weight changes. She notes SOB with activity has been stable.     Check veins on legs.      Review of Systems  Constitutional, HEENT, cardiovascular, pulmonary, gi and gu systems are negative, except as otherwise noted.      Objective    /70   Pulse 62   Temp 97.9  F (36.6  C) (Tympanic)   Resp 16   Ht 1.575 m (5' 2\")   Wt 62.4 kg (137 lb 8 oz)   LMP 01/01/1970   SpO2 99%   BMI 25.15 kg/m    Body mass index is 25.15 kg/m .  Physical Exam   GENERAL: alert, no distress, and elderly  EYES: Eyes grossly normal to inspection  NECK: no adenopathy, no asymmetry, masses, or scars  RESP: lungs clear to auscultation - no rales, rhonchi or wheezes  CV: regular rates and rhythm, normal S1 S2, no S3 or S4, and no murmur, click or rub  MS: no gross " musculoskeletal defects noted, no edema  SKIN: Varicose vein involving the right lower leg  PSYCH: mentation appears normal, affect normal/bright  CNS: grossly intact      Signed Electronically by: Nazario Valenzuela MD

## 2025-08-25 DIAGNOSIS — I10 ESSENTIAL HYPERTENSION: ICD-10-CM

## 2025-08-25 RX ORDER — LOSARTAN POTASSIUM 100 MG/1
TABLET ORAL
Qty: 30 TABLET | Refills: 5 | Status: SHIPPED | OUTPATIENT
Start: 2025-08-25